# Patient Record
Sex: MALE | Race: WHITE
[De-identification: names, ages, dates, MRNs, and addresses within clinical notes are randomized per-mention and may not be internally consistent; named-entity substitution may affect disease eponyms.]

---

## 2018-07-15 ENCOUNTER — HOSPITAL ENCOUNTER (EMERGENCY)
Age: 23
Discharge: HOME | End: 2018-07-15

## 2018-07-15 ENCOUNTER — HOSPITAL ENCOUNTER (EMERGENCY)
Dept: HOSPITAL 91 - E/R | Age: 23
Discharge: HOME | End: 2018-07-15
Payer: SELF-PAY

## 2018-07-15 DIAGNOSIS — R40.2362: ICD-10-CM

## 2018-07-15 DIAGNOSIS — F15.129: Primary | ICD-10-CM

## 2018-07-15 DIAGNOSIS — R40.2252: ICD-10-CM

## 2018-07-15 DIAGNOSIS — F17.210: ICD-10-CM

## 2018-07-15 DIAGNOSIS — R40.2142: ICD-10-CM

## 2018-07-15 LAB
ADD MAN DIFF?: NO
ANION GAP: 22 (ref 8–16)
BASOPHIL #: 0 10^3/UL (ref 0–0.1)
BASOPHILS %: 0.4 % (ref 0–2)
BLOOD UREA NITROGEN: 9 MG/DL (ref 7–20)
CALCIUM: 10.1 MG/DL (ref 8.4–10.2)
CARBON DIOXIDE: 17 MMOL/L (ref 21–31)
CHLORIDE: 107 MMOL/L (ref 97–110)
CREATININE: 0.9 MG/DL (ref 0.61–1.24)
EOSINOPHILS #: 0 10^3/UL (ref 0–0.5)
EOSINOPHILS %: 0.1 % (ref 0–7)
GLUCOSE: 120 MG/DL (ref 70–220)
HEMATOCRIT: 47.7 % (ref 42–52)
HEMOGLOBIN: 16.3 G/DL (ref 14–18)
LYMPHOCYTES #: 1.9 10^3/UL (ref 0.8–2.9)
LYMPHOCYTES %: 27 % (ref 15–51)
MEAN CORPUSCULAR HEMOGLOBIN: 30.4 PG (ref 29–33)
MEAN CORPUSCULAR HGB CONC: 34.2 G/DL (ref 32–37)
MEAN CORPUSCULAR VOLUME: 88.8 FL (ref 82–101)
MEAN PLATELET VOLUME: 9.8 FL (ref 7.4–10.4)
MONOCYTE #: 0.5 10^3/UL (ref 0.3–0.9)
MONOCYTES %: 7.4 % (ref 0–11)
NEUTROPHIL #: 4.5 10^3/UL (ref 1.6–7.5)
NEUTROPHILS %: 64.8 % (ref 39–77)
NUCLEATED RED BLOOD CELLS #: 0 10^3/UL (ref 0–0)
NUCLEATED RED BLOOD CELLS%: 0 /100WBC (ref 0–0)
PLATELET COUNT: 317 10^3/UL (ref 140–415)
POTASSIUM: 3.2 MMOL/L (ref 3.5–5.1)
RED BLOOD COUNT: 5.37 10^6/UL (ref 4.7–6.1)
RED CELL DISTRIBUTION WIDTH: 13.2 % (ref 11.5–14.5)
SODIUM: 143 MMOL/L (ref 135–144)
WHITE BLOOD COUNT: 7 10^3/UL (ref 4.8–10.8)

## 2018-07-15 PROCEDURE — 71045 X-RAY EXAM CHEST 1 VIEW: CPT

## 2018-07-15 PROCEDURE — 85025 COMPLETE CBC W/AUTO DIFF WBC: CPT

## 2018-07-15 PROCEDURE — 80307 DRUG TEST PRSMV CHEM ANLYZR: CPT

## 2018-07-15 PROCEDURE — 96374 THER/PROPH/DIAG INJ IV PUSH: CPT

## 2018-07-15 PROCEDURE — 93005 ELECTROCARDIOGRAM TRACING: CPT

## 2018-07-15 PROCEDURE — 36415 COLL VENOUS BLD VENIPUNCTURE: CPT

## 2018-07-15 PROCEDURE — 99285 EMERGENCY DEPT VISIT HI MDM: CPT

## 2018-07-15 PROCEDURE — 80048 BASIC METABOLIC PNL TOTAL CA: CPT

## 2018-07-15 RX ADMIN — LIDOCAINE HYDROCHLORIDE 1 MLS/HR: 10 INJECTION, SOLUTION EPIDURAL; INFILTRATION; INTRACAUDAL; PERINEURAL at 16:25

## 2018-07-15 RX ADMIN — LORAZEPAM 1 MG: 2 INJECTION, SOLUTION INTRAMUSCULAR; INTRAVENOUS at 16:25

## 2018-07-15 RX ADMIN — POTASSIUM CHLORIDE 1 MEQ: 1500 TABLET, EXTENDED RELEASE ORAL at 17:23

## 2019-07-14 ENCOUNTER — HOSPITAL ENCOUNTER (EMERGENCY)
Dept: HOSPITAL 10 - E/R | Age: 24
LOS: 1 days | Discharge: HOME | End: 2019-07-15
Payer: COMMERCIAL

## 2019-07-14 ENCOUNTER — HOSPITAL ENCOUNTER (EMERGENCY)
Dept: HOSPITAL 91 - E/R | Age: 24
LOS: 1 days | Discharge: HOME | End: 2019-07-15
Payer: COMMERCIAL

## 2019-07-14 VITALS
WEIGHT: 165.35 LBS | HEIGHT: 68 IN | HEIGHT: 68 IN | BODY MASS INDEX: 25.06 KG/M2 | WEIGHT: 165.35 LBS | BODY MASS INDEX: 25.06 KG/M2

## 2019-07-14 DIAGNOSIS — F17.210: ICD-10-CM

## 2019-07-14 DIAGNOSIS — F15.129: Primary | ICD-10-CM

## 2019-07-14 LAB
ADD MAN DIFF?: NO
BASOPHIL #: 0 10^3/UL (ref 0–0.1)
BASOPHILS %: 0.4 % (ref 0–2)
EOSINOPHILS #: 0.1 10^3/UL (ref 0–0.5)
EOSINOPHILS %: 1.1 % (ref 0–7)
HEMATOCRIT: 52.9 % (ref 42–52)
HEMOGLOBIN: 18.1 G/DL (ref 14–18)
IMMATURE GRANS #M: 0.02 10^3/UL (ref 0–0.03)
IMMATURE GRANS % (M): 0.2 % (ref 0–0.43)
LYMPHOCYTES #: 4.2 10^3/UL (ref 0.8–2.9)
LYMPHOCYTES %: 41.2 % (ref 15–51)
MEAN CORPUSCULAR HEMOGLOBIN: 30.1 PG (ref 29–33)
MEAN CORPUSCULAR HGB CONC: 34.2 G/DL (ref 32–37)
MEAN CORPUSCULAR VOLUME: 87.9 FL (ref 82–101)
MEAN PLATELET VOLUME: 9.3 FL (ref 7.4–10.4)
MONOCYTE #: 0.8 10^3/UL (ref 0.3–0.9)
MONOCYTES %: 8.2 % (ref 0–11)
NEUTROPHIL #: 5 10^3/UL (ref 1.6–7.5)
NEUTROPHILS %: 48.9 % (ref 39–77)
NUCLEATED RED BLOOD CELLS #: 0 10^3/UL (ref 0–0)
NUCLEATED RED BLOOD CELLS%: 0 /100WBC (ref 0–0)
PLATELET COUNT: 361 10^3/UL (ref 140–415)
RED BLOOD COUNT: 6.02 10^6/UL (ref 4.7–6.1)
RED CELL DISTRIBUTION WIDTH: 12.4 % (ref 11.5–14.5)
WHITE BLOOD COUNT: 10.2 10^3/UL (ref 4.8–10.8)

## 2019-07-14 PROCEDURE — 96372 THER/PROPH/DIAG INJ SC/IM: CPT

## 2019-07-14 PROCEDURE — 85025 COMPLETE CBC W/AUTO DIFF WBC: CPT

## 2019-07-14 PROCEDURE — 99284 EMERGENCY DEPT VISIT MOD MDM: CPT

## 2019-07-14 PROCEDURE — 81003 URINALYSIS AUTO W/O SCOPE: CPT

## 2019-07-14 PROCEDURE — 80307 DRUG TEST PRSMV CHEM ANLYZR: CPT

## 2019-07-14 PROCEDURE — 80053 COMPREHEN METABOLIC PANEL: CPT

## 2019-07-15 VITALS — HEART RATE: 64 BPM | DIASTOLIC BLOOD PRESSURE: 63 MMHG | SYSTOLIC BLOOD PRESSURE: 110 MMHG | RESPIRATION RATE: 16 BRPM

## 2019-07-15 LAB
ACETAMINOPHEN: < 10 UG/ML (ref 10–30)
ADD UMIC: NO
ALANINE AMINOTRANSFERASE: 42 IU/L (ref 13–69)
ALBUMIN/GLOBULIN RATIO: 1.27
ALBUMIN: 5.5 G/DL (ref 3.3–4.9)
ALKALINE PHOSPHATASE: 124 IU/L (ref 42–121)
ANION GAP: 20 (ref 5–13)
ASPARTATE AMINO TRANSFERASE: 39 IU/L (ref 15–46)
BILIRUBIN,DIRECT: 0 MG/DL (ref 0–0.2)
BILIRUBIN,TOTAL: 0.4 MG/DL (ref 0.2–1.3)
BLOOD UREA NITROGEN: 10 MG/DL (ref 7–20)
CALCIUM: 10.9 MG/DL (ref 8.4–10.2)
CARBON DIOXIDE: 21 MMOL/L (ref 21–31)
CHLORIDE: 103 MMOL/L (ref 97–110)
CREATININE: 0.99 MG/DL (ref 0.61–1.24)
ETHANOL: 29 MG/DL (ref 0–0)
GLOBULIN: 4.3 G/DL (ref 1.3–3.2)
GLUCOSE: 127 MG/DL (ref 70–220)
POTASSIUM: 3.1 MMOL/L (ref 3.5–5.1)
SALICYLATE: < 1 MG/DL (ref 5–30)
SODIUM: 144 MMOL/L (ref 135–144)
TOTAL PROTEIN: 9.8 G/DL (ref 6.1–8.1)
UR ASCORBIC ACID: NEGATIVE MG/DL
UR BILIRUBIN (DIP): NEGATIVE MG/DL
UR BLOOD (DIP): NEGATIVE MG/DL
UR CLARITY: CLEAR
UR COLOR: (no result)
UR GLUCOSE (DIP): NEGATIVE MG/DL
UR KETONES (DIP): (no result) MG/DL
UR LEUKOCYTE ESTERASE (DIP): NEGATIVE LEU/UL
UR NITRITE (DIP): NEGATIVE MG/DL
UR PH (DIP): 7 (ref 5–9)
UR SPECIFIC GRAVITY (DIP): 1 (ref 1–1.03)
UR TOTAL PROTEIN (DIP): NEGATIVE MG/DL
UR UROBILINOGEN (DIP): NEGATIVE MG/DL

## 2019-07-15 RX ADMIN — HALOPERIDOL LACTATE 1 MG: 5 INJECTION, SOLUTION INTRAMUSCULAR at 00:18

## 2019-07-15 RX ADMIN — LORAZEPAM 1 MG: 2 INJECTION, SOLUTION INTRAMUSCULAR; INTRAVENOUS at 00:18

## 2019-07-15 NOTE — ERD
ER Documentation


Chief Complaint


Chief Complaint





BIB SELF, CC: METH, MARIJUANA OVERDOSE





HPI


Is a 24-year-old male brought in by self with complaints of meth and marijuana 


overdose.  Patient was very agitated upon arrival.  He was not making much 


sense.  But he does admit to using drugs today.  Telemetry psychiatry ordered





ROS


All systems reviewed and are negative except as per history of present illness.





Medications


Home Meds


Active Scripts


Ibuprofen* (Motrin*) 600 Mg Tab, 600 MG PO Q6, #30 TAB


   Prov:YANG FOLEY         9/8/17





Allergies


Allergies:  


Coded Allergies:  


     No Known Drug Allergies (Verified  Allergy, Unknown, 4/30/14)





PMhx/Soc


History of Surgery:  No


Anesthesia Reaction:  No


Hx Neurological Disorder:  No


Hx Respiratory Disorders:  No


Hx Cardiac Disorders:  No


Hx Psychiatric Problems:  No


Hx Miscellaneous Medical Probl:  No


Hx Alcohol Use:  Yes (Occasionally)


Hx Substance Use:  No


Hx Tobacco Use:  Yes


Smoking Status:  Current every day smoker





Physical Exam


Vitals





Vital Signs


  Date      Temp  Pulse  Resp  B/P (MAP)   Pulse Ox  O2          O2 Flow    FiO2


Time                                                 Delivery    Rate


   7/14/19  98.9    112    25      126/65       100


     23:33                           (85)





Physical Exam


Const:   No acute distress


Head:   Atraumatic 


Eyes:    Normal Conjunctiva


ENT:    Normal External Ears, Nose and Mouth.


Neck:               Full range of motion. No meningismus.


Resp:   Clear to auscultation bilaterally


Cardio:   Regular rate and rhythm, no murmurs


Abd:    Soft, non tender, non distended. Normal bowel sounds


Skin:   No petechiae or rashes


Back:   No midline or flank tenderness


Ext:    No cyanosis, or edema


Neur:   Awake and alert


Psych:    Normal Mood and Affect


Result Diagram:  


7/14/19 2347                                                                    


           7/14/19 2347





Results 24 hrs





Laboratory Tests


      Test
                                 7/14/19
23:47    7/15/19
01:14


      White Blood Count                     10.2 10^3/ul


      Red Blood Count                       6.02 10^6/ul


      Hemoglobin                               18.1 g/dl


      Hematocrit                                  52.9 %


      Mean Corpuscular Volume                    87.9 fl


      Mean Corpuscular Hemoglobin                30.1 pg


      Mean Corpuscular Hemoglobin
Concent     34.2 g/dl 
  



      Red Cell Distribution Width                 12.4 %


      Platelet Count                         361 10^3/UL


      Mean Platelet Volume                        9.3 fl


      Immature Granulocytes %                    0.200 %


      Neutrophils %                               48.9 %


      Lymphocytes %                               41.2 %


      Monocytes %                                  8.2 %


      Eosinophils %                                1.1 %


      Basophils %                                  0.4 %


      Nucleated Red Blood Cells %            0.0 /100WBC


      Immature Granulocytes #              0.020 10^3/ul


      Neutrophils #                          5.0 10^3/ul


      Lymphocytes #                          4.2 10^3/ul


      Monocytes #                            0.8 10^3/ul


      Eosinophils #                          0.1 10^3/ul


      Basophils #                            0.0 10^3/ul


      Nucleated Red Blood Cells #            0.0 10^3/ul


      Sodium Level                            144 mmol/L


      Potassium Level                         3.1 mmol/L


      Chloride Level                          103 mmol/L


      Carbon Dioxide Level                     21 mmol/L


      Anion Gap                                       20


      Blood Urea Nitrogen                       10 mg/dl


      Creatinine                              0.99 mg/dl


      Est Glomerular Filtrat Rate
mL/min   > 60 mL/min 
   



      Glucose Level                            127 mg/dl


      Calcium Level                           10.9 mg/dl


      Total Bilirubin                          0.4 mg/dl


      Direct Bilirubin                        0.00 mg/dl


      Indirect Bilirubin                       0.4 mg/dl


      Aspartate Amino Transf
(AST/SGOT)         39 IU/L 
  



      Alanine Aminotransferase
(ALT/SGPT)       42 IU/L 
  



      Alkaline Phosphatase                      124 IU/L


      Total Protein                             9.8 g/dl


      Albumin                                   5.5 g/dl


      Globulin                                 4.30 g/dl


      Albumin/Globulin Ratio                        1.27


      Salicylates Level                    < 1.0 mg/dl


      Acetaminophen Level                  < 10.0 ug/ml


      Ethyl Alcohol Level                     29.0 mg/dl


      Urine Color                                          STRAW


      Urine Clarity                                        CLEAR


      Urine pH                                                        7.0


      Urine Specific Gravity                                        1.004


      Urine Ketones                                        TRACE mg/dL


      Urine Nitrite                                        NEGATIVE mg/dL


      Urine Bilirubin                                      NEGATIVE mg/dL


      Urine Urobilinogen                                   NEGATIVE mg/dL


      Urine Leukocyte Esterase
            
               NEGATIVE
Felisa/ul


      Urine Hemoglobin                                     NEGATIVE mg/dL


      Urine Glucose                                        NEGATIVE mg/dL


      Urine Total Protein                                  NEGATIVE mg/dl


      Urine Opiates Screen                                 Negative


      Urine Barbiturates                                   Negative


      Urine Amphetamines Screen                            Positive


      Urine Benzodiazepines Screen                         Negative


      Urine Cocaine Screen                                 Negative


      Urine Cannabinoids                                   Negative





Current Medications


 Medications
   Dose
          Sig/Cindy
       Start Time
   Status  Last


 (Trade)       Ordered        Route
 PRN     Stop Time              Admin
Dose


                              Reason                                Admin


 Lorazepam
     2 mg           ONCE  ONCE
    7/15/19       DC           7/15/19


(Ativan)                      IM
            00:30
                       00:18



                                             7/15/19 00:31


 Haloperidol
   5 mg           ONCE  ONCE
    7/15/19       DC           7/15/19


 (Haldol)                     IM
            00:30
                       00:18



                                             7/15/19 00:31








Procedures/MDM


Patient's behavioral symptoms have stabilized while in the department.  Patient 


is medically cleared and appropriate for psychiatric evaluation and work up.


No e/o neurologic, toxic, infectious, or metabolic cause.  Patient evaluated and


found to be essentially under the influence of drugs.  Required Haldol and 


Ativan for sedation.  Will be reevaluated in the a.m. my oncoming physician for 


possible discharge





Departure


Diagnosis:  


   Primary Impression:  


   Methamphetamine intoxication


Condition:  Stable











HARITHA MINOR             Jul 15, 2019 02:33
